# Patient Record
Sex: MALE | Race: WHITE
[De-identification: names, ages, dates, MRNs, and addresses within clinical notes are randomized per-mention and may not be internally consistent; named-entity substitution may affect disease eponyms.]

---

## 2024-02-20 PROBLEM — Z00.00 ENCOUNTER FOR PREVENTIVE HEALTH EXAMINATION: Status: ACTIVE | Noted: 2024-02-20

## 2024-02-28 ENCOUNTER — APPOINTMENT (OUTPATIENT)
Dept: PULMONOLOGY | Facility: CLINIC | Age: 34
End: 2024-02-28
Payer: MEDICAID

## 2024-02-28 VITALS
OXYGEN SATURATION: 99 % | DIASTOLIC BLOOD PRESSURE: 86 MMHG | WEIGHT: 200 LBS | BODY MASS INDEX: 31.39 KG/M2 | HEIGHT: 67 IN | SYSTOLIC BLOOD PRESSURE: 150 MMHG | HEART RATE: 79 BPM | TEMPERATURE: 98.7 F

## 2024-02-28 PROCEDURE — 94727 GAS DIL/WSHOT DETER LNG VOL: CPT

## 2024-02-28 PROCEDURE — 99203 OFFICE O/P NEW LOW 30 MIN: CPT | Mod: 25

## 2024-02-28 PROCEDURE — 94060 EVALUATION OF WHEEZING: CPT

## 2024-02-28 PROCEDURE — 94729 DIFFUSING CAPACITY: CPT

## 2024-02-28 NOTE — PROCEDURE
[FreeTextEntry1] : PFT  normal spirometry, lung volumes and diffusion    [facility] Gallardo [facility]

## 2024-02-28 NOTE — HISTORY OF PRESENT ILLNESS
[Never] : never [TextBox_4] : 33 year old patient presents for evaluation of abnormal spirometry   He has complained of some cough.  Denies dyspnea, he is active, plays soccer and swiims 4000 meters per session. He has some chest tightness.  he has worked in construction.  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       Primary doctor is Dr Darin Parra     PSH:     cholecystectomy appendectomy      PMH:  takes Vit D  hypo Vit D  possible  HTN     SH:   non  smoker   former smoker  cannabis as experiment, none now      ETOH:  occasional     Occupation: construction   Exposed to , dust, asbestos in past        ALLERGY:   NKDA     environmental/seasonal allergy: denies       Review of Systems:   No rash, skin problems No dry eyes no mouth ulcers no sinusitis, sinus infections, nasal obstruction no dysphagia no dry mouth   no arthritis no joint aches no joint swelling     no pneumonia no wheeze no lung cancer   no CAD no MI no chest pain no murmur no CHF  no edema   no peptic ulcer or gastritis no GERD no abdominal pain no liver disease   no Diabetes no thyroid disease no hyperlipidemia     no bleeding   no DVT or PE   no kidney disease   no stroke no seizure

## 2024-02-28 NOTE — DISCUSSION/SUMMARY
[FreeTextEntry1] : 33 year old man presents with some cough and abnormal spirometry  he had a URI/bronchitis 2 months ago that required antibiotics  he has had some cough, particularly with strenuous exercise, since the URI  His PFT is normal, no obstructive airway disease or restriction  He is exposed to asbestos  A CXR suggested a basilar process, possible atelectasis  PLAN given asbestos exposure, I will order NC CT chest  he should have regular follow up if any evidence of asbestos related lung condition  Fran Gallardo MD

## 2024-02-28 NOTE — PHYSICAL EXAM
[No Acute Distress] : no acute distress [Well Nourished] : well nourished [Normal Oropharynx] : normal oropharynx [Normal Appearance] : normal appearance [I] : Mallampati Class: I [Supple] : supple [No JVD] : no jvd [No Neck Mass] : no neck mass [Normal S1, S2] : normal s1, s2 [Normal Rate/Rhythm] : normal rate/rhythm [No Murmurs] : no murmurs [No Resp Distress] : no resp distress [Benign] : benign [Clear to Auscultation Bilaterally] : clear to auscultation bilaterally [Not Tender] : not tender [No Masses] : no masses [No Clubbing] : no clubbing [No Edema] : no edema [No Focal Deficits] : no focal deficits [Oriented x3] : oriented x3 [Normal Affect] : normal affect

## 2024-03-17 NOTE — PROCEDURE
[FreeTextEntry1] : PFT  normal spirometry, lung volumes and diffusion    [facility]

## 2024-03-17 NOTE — PHYSICAL EXAM
[No Acute Distress] : no acute distress [Normal Oropharynx] : normal oropharynx [Well Nourished] : well nourished [I] : Mallampati Class: I [Normal Appearance] : normal appearance [Supple] : supple [No Neck Mass] : no neck mass [No JVD] : no jvd [Normal Rate/Rhythm] : normal rate/rhythm [No Murmurs] : no murmurs [Normal S1, S2] : normal s1, s2 [Clear to Auscultation Bilaterally] : clear to auscultation bilaterally [No Resp Distress] : no resp distress [Benign] : benign [No Masses] : no masses [Not Tender] : not tender [No Edema] : no edema [No Clubbing] : no clubbing [Oriented x3] : oriented x3 [No Focal Deficits] : no focal deficits [Normal Affect] : normal affect

## 2024-03-20 ENCOUNTER — APPOINTMENT (OUTPATIENT)
Dept: PULMONOLOGY | Facility: CLINIC | Age: 34
End: 2024-03-20

## 2024-04-04 ENCOUNTER — NON-APPOINTMENT (OUTPATIENT)
Age: 34
End: 2024-04-04

## 2024-04-04 ENCOUNTER — TRANSCRIPTION ENCOUNTER (OUTPATIENT)
Age: 34
End: 2024-04-04

## 2024-04-11 NOTE — PHYSICAL EXAM
[No Acute Distress] : no acute distress [Well Nourished] : well nourished [Normal Oropharynx] : normal oropharynx [I] : Mallampati Class: I [Normal Appearance] : normal appearance [Supple] : supple [No Neck Mass] : no neck mass [No JVD] : no jvd [Normal Rate/Rhythm] : normal rate/rhythm [Normal S1, S2] : normal s1, s2 [No Murmurs] : no murmurs [No Resp Distress] : no resp distress [Clear to Auscultation Bilaterally] : clear to auscultation bilaterally [Benign] : benign [Not Tender] : not tender [No Masses] : no masses [No Clubbing] : no clubbing [No Edema] : no edema [No Focal Deficits] : no focal deficits [Oriented x3] : oriented x3 [Normal Affect] : normal affect

## 2024-04-12 ENCOUNTER — APPOINTMENT (OUTPATIENT)
Dept: PULMONOLOGY | Facility: CLINIC | Age: 34
End: 2024-04-12
Payer: MEDICAID

## 2024-04-12 VITALS
WEIGHT: 201 LBS | RESPIRATION RATE: 16 BRPM | HEIGHT: 67 IN | BODY MASS INDEX: 31.55 KG/M2 | TEMPERATURE: 99.9 F | HEART RATE: 80 BPM | SYSTOLIC BLOOD PRESSURE: 136 MMHG | DIASTOLIC BLOOD PRESSURE: 92 MMHG | OXYGEN SATURATION: 99 %

## 2024-04-12 DIAGNOSIS — J98.11 ATELECTASIS: ICD-10-CM

## 2024-04-12 DIAGNOSIS — Z77.090 CONTACT WITH AND (SUSPECTED) EXPOSURE TO ASBESTOS: ICD-10-CM

## 2024-04-12 DIAGNOSIS — R05.3 CHRONIC COUGH: ICD-10-CM

## 2024-04-12 DIAGNOSIS — R94.2 ABNORMAL RESULTS OF PULMONARY FUNCTION STUDIES: ICD-10-CM

## 2024-04-12 DIAGNOSIS — R91.1 SOLITARY PULMONARY NODULE: ICD-10-CM

## 2024-04-12 PROCEDURE — 99214 OFFICE O/P EST MOD 30 MIN: CPT

## 2024-04-12 NOTE — PROCEDURE
[FreeTextEntry1] : PFT  normal spirometry, lung volumes and diffusion    [facility]

## 2024-04-12 NOTE — DISCUSSION/SUMMARY
[FreeTextEntry1] : 33 year old man presents with some cough and abnormal spirometry  he had a URI/bronchitis 2 months ago that required antibiotics  he has had some cough, particularly with strenuous exercise, since the URI  His PFT is normal, no obstructive airway disease or restriction  He is exposed to asbestos.  Last worked  with asbestos 8 years ago, with [employer] in [facility]  A CXR suggested a basilar process, possible atelectasis  CT chest demonstrated a 3 mm nodule in RML with no evidence of asbestos exposure or interstitial lung disease   PLAN  given asbestos exposure, I will order NC CT chest 3/2025to monitor the lung nodule given asbestos exposure  he should have regular follow up if any evidence of asbestos related lung condition  Encoraged deep breathing Incentive spirometer  Total time spent : 30 minutes Including: Preparation prior to visit - Reviewing prior record, results of tests and Consultation Reports as applicable Conducting an appropriate H & P during today's encounter Appropriate orders for tests, medications and procedures, as applicable Counseling patient  Note completion   Fran Gallardo MD